# Patient Record
Sex: FEMALE | Race: OTHER
[De-identification: names, ages, dates, MRNs, and addresses within clinical notes are randomized per-mention and may not be internally consistent; named-entity substitution may affect disease eponyms.]

---

## 2019-03-17 ENCOUNTER — HOSPITAL ENCOUNTER (EMERGENCY)
Dept: HOSPITAL 25 - ED | Age: 19
Discharge: HOME | End: 2019-03-17
Payer: COMMERCIAL

## 2019-03-17 VITALS — DIASTOLIC BLOOD PRESSURE: 70 MMHG | SYSTOLIC BLOOD PRESSURE: 113 MMHG

## 2019-03-17 DIAGNOSIS — F10.129: Primary | ICD-10-CM

## 2019-03-17 PROCEDURE — 99283 EMERGENCY DEPT VISIT LOW MDM: CPT

## 2019-03-17 NOTE — ED
Substance Abuse/Use





- HPI Summary


HPI Summary: 


LEVEL 5 CAVEAT DUE TO ALCOHOL INTOXICATION


This patient is a 18 year old F brought in by ambulance from [place] to [facility] 

with a chief complaint of alcohol intoxication since PTA. Her friends called 

EMS. The patient rates the pain 0/10 in severity. Symptoms aggravated by 

nothing. Symptoms alleviated by nothing. EMS reports N/V.





- History Of Current Complaint


Chief Complaint: EDSubstanceAbuse


Stated Complaint: ETOH PER EMS


Hx Obtained From: EMS


Hx From Patient Unobtainable Due To: Other - LEVEL 5 CAVEAT DUE TO ALCOHOL 

INTOXICATION


Onset/Duration  of Drug/ETOH Abuse: Hours


Timing Of Abuse: Binge Use


Severity Currently: None


Aggravating Factor(s): Nothing


Alleviating Factor(s): Nothing


Associated Signs And Symptoms: Nausea, Vomiting





- Allergies/Home Medications


Allergies/Adverse Reactions: 


 Allergies











Allergy/AdvReac Type Severity Reaction Status Date / Time


 


Unable to Assess Allergy   Verified 03/17/19 02:20











Home Medications: 


 Home Medications





Unobtainable  03/17/19 [History Confirmed 03/17/19]











PMH/Surg Hx/FS Hx/Imm Hx


Previously Healthy: No - LEVEL 5 CAVEAT DUE TO ALCOHOL INTOXICATION


Cardiovascular History: 


   Denies: Hx Coronary Artery Disease





- Immunization History


Immunizations Up to Date: Unable to Obtain/Confirm


Infectious Disease History: Unable to Obtain/Confirm


Infectious Disease History: 


   Denies: Traveled Outside the [place] in Last 30 Days





- Family History


Known Family History: Positive: Unknown - LEVEL 5 CAVEAT DUE TO ALCOHOL 

INTOXICATION





- Social History


Alcohol Use: unable to obtain


Hx Substance Use: No


Substance Use Type: Reports: None


Substance Use Comment - Amount & Last Used: unable to obtain


Hx Tobacco Use: No


Smoking Status (MU): Unknown if Ever Smoked





Review of Systems


Positive: Other - alcohol intoxication


Positive: Vomiting, Nausea


All Other Systems Reviewed And Are Negative: No





Physical Exam





- Summary


Physical Exam Summary: 


Appearance: Appears intoxicated


Skin: Warm, dry, no obvious rash


Eyes: sclera anicteric, no conjunctival pallor


ENT: mucous membranes moist


Neck: deferred      


Respiratory: No signs of respiratory distress


Cardiovascular: Appears well perfused, pulses are nml


Abdomen: deferred


Musculoskeletal: Moving all 4 extremities without obvious discomfort


Triage Information Reviewed: Yes


Vital Signs On Initial Exam: 


 Initial Vitals











Temp Pulse Resp BP Pulse Ox


 


 97.4 F   109   14   130/83   97 


 


 03/17/19 02:06  03/17/19 02:06  03/17/19 02:06  03/17/19 02:06  03/17/19 02:06











Vital Signs Reviewed: Yes


Completion Of Physical Exam Limited Due To: Level 5 - LEVEL 5 CAVEAT DUE TO 

ALCOHOL INTOXICATION





Diagnostics





- Vital Signs


 Vital Signs











  Temp Pulse Resp BP Pulse Ox


 


 03/17/19 02:14   94   117/80  97


 


 03/17/19 02:06  97.4 F  109  14  130/83  97














- Laboratory


Lab Statement: Any lab studies that have been ordered have been reviewed, and 

results considered in the medical decision making process.





Course/Dx





- Course


Assessment/Plan: LEVEL 5 CAVEAT DUE TO ALCOHOL INTOXICATION.  This patient is a 

18 year old F brought in by ambulance from [place] to [facility] with a chief 

complaint of alcohol intoxication since PTA. After sobering up, the patient 

will be discharged with dx of alcohol intoxication. Patient understands and 

agrees with this plan.





- Diagnoses


Differential Diagnosis/HQI/PQRI: Positive: Other - alcohol intoxication


Provider Diagnoses: 


 Alcohol intoxication








Discharge





- Sign-Out/Discharge


Documenting (check all that apply): Patient Departure - discharge


Patient Received Moderate/Deep Sedation with Procedure: No





- Discharge Plan


Condition: Improved


Disposition: HOME


Patient Education Materials:  Alcohol Intoxication (ED)


Referrals: 


[facility],[place] [Primary Care Provider] - 


Additional Instructions: 


Ending up in an ER because of alcohol intoxication is often a red flag that you 

may have a drinking problem. I would encourage you to reflect on your drinking, 

perhaps talk to friends about it, and maybe even attend an AA meeting. It is 

much easier to change a problem behavior when you are young than ignoring it 

until you get older and the behavior gets more entrenched.





- Attestation Statements


Document Initiated by Scribe: Yes


Documenting Scribe: Adis Key


Provider For Whom Scribe is Documenting (Include Credential): Huan Haynes MD


Scribe Attestation: 


I, Adis Key, scribed for Huan Haynes MD on 03/17/19 at 0435.

## 2020-01-26 ENCOUNTER — HOSPITAL ENCOUNTER (EMERGENCY)
Dept: HOSPITAL 25 - UCEAST | Age: 20
Discharge: HOME | End: 2020-01-26
Payer: COMMERCIAL

## 2020-01-26 VITALS — SYSTOLIC BLOOD PRESSURE: 110 MMHG | DIASTOLIC BLOOD PRESSURE: 70 MMHG

## 2020-01-26 DIAGNOSIS — J10.1: Primary | ICD-10-CM

## 2020-01-26 LAB — FLUBV RNA SPEC QL NAA+PROBE: POSITIVE

## 2020-01-26 PROCEDURE — G0463 HOSPITAL OUTPT CLINIC VISIT: HCPCS

## 2020-01-26 PROCEDURE — 99212 OFFICE O/P EST SF 10 MIN: CPT

## 2020-01-26 NOTE — UC
FLU HPI





- HPI Summary


HPI Summary: 


LESS THAN 48 HOURS OF COUGH, CONGESTION, HEADACHE, BODY ACHES, FATIGUE AND SORE 

THROAT.  UP-TO-DATE FLU SHOT.  PALOMO STUDENT.








- History of Current Complaint


Chief Complaint: UCRespiratory


Stated Complaint: CONGESTION


Time Seen by Provider: 01/26/20 18:22


Hx Obtained From: Patient


Hx Last Menstrual Period: 1/2/20


Onset/Duration: Gradual Onset, Lasting Days, Still Present


Severity Currently: Moderate


Severity Initially: Moderate


Pain Intensity: 5


Pain Scale Used: 0-10 Numeric


Associated Signs & Symptoms: Positive: Fever, Myalgia, Cough, Sore Throat, 

Nasal Congestion, Headache





- Allergy/Home Medications


Allergies/Adverse Reactions: 


 Allergies











Allergy/AdvReac Type Severity Reaction Status Date / Time


 


No Known Allergies Allergy   Verified 01/26/20 18:08











Home Medications: 


 Home Medications





Birth Control 1 tab PO DAILY 01/26/20 [History Confirmed 01/26/20]


Ibuprofen TAB* [Motrin TAB* 400 MG] 400 mg PO Q6HR PRN 01/26/20 [History 

Confirmed 01/26/20]











PMH/Surg Hx/FS Hx/Imm Hx


Respiratory History: Asthma





- Surgical History


Surgical History: None





- Family History


Known Family History: Positive: Non-Contributory





- Social History


Alcohol Use: Occasionally


Substance Use Type: Marijuana


Substance Use Comment - Amount & Last Used: occasional


Smoking Status (MU): Never Smoked Tobacco





Review of Systems


All Other Systems Reviewed And Are Negative: Yes


Constitutional: Positive: Fever, Chills, Fatigue


ENT: Positive: Sore Throat, Nasal Discharge


Respiratory: Positive: Cough


Cardiovascular: Positive: Negative


Gastrointestinal: Positive: Negative


Musculoskeletal: Positive: Myalgia


Neurological: Positive: Headache





Physical Exam


Triage Information Reviewed: Yes


Appearance: Well-Appearing, No Pain Distress, Well-Nourished


Vital Signs: 


 Initial Vital Signs











Temp  98.3 F   01/26/20 18:04


 


Pulse  94   01/26/20 18:04


 


Resp  16   01/26/20 18:04


 


BP  110/70   01/26/20 18:04


 


Pulse Ox  100   01/26/20 18:04








 Laboratory Tests











  01/26/20





  18:19


 


Influenza B (Rapid)  Positive A











Vital Signs Reviewed: Yes


Eyes: Positive: Conjunctiva Clear


ENT: Positive: Hearing grossly normal, Pharynx normal, TMs normal


Neck: Positive: Supple, Nontender, No Lymphadenopathy


Respiratory Exam: Normal


Cardiovascular Exam: Normal


Abdomen Description: Positive: Soft


Musculoskeletal: Positive: No Edema


Neurological: Positive: Alert


Psychological: Positive: Age Appropriate Behavior


Skin: Negative: Rashes





Flu Course/Dx





- Course


Course Of Treatment: 





SWAB POSITIVE FOR INFLUENZA B.  TAMIFLU TWICE DAILY FOR 5 DAYS.  REST, HYDRATE, 

OTC MEDS AS NEEDED.  OUT OF SCHOOL FOR THE NEXT WEEK.





- Differential Dx/Diagnosis


Provider Diagnosis: 


 Influenza B








Discharge ED





- Sign-Out/Discharge


Documenting (check all that apply): Patient Departure


All imaging exams completed and their final reports reviewed: No Studies





- Discharge Plan


Condition: Stable


Disposition: HOME


Prescriptions: 


Oseltamivir CAP* [Tamiflu CAP*] 75 mg PO BID #8 cap


Patient Education Materials:  Influenza (ED)


Forms:  *School Release


Referrals: 


[facility] [Provider Group] - If Needed


Additional Instructions: 


SWAB POSITIVE FOR INFLUENZA B. TAMIFLU TWICE DAILY FOR 5 DAYS. OTC MEDS AS 

NEEDED FOR FEVER, BODY ACHES. STAY WELL HYDRATED AND RESTED. SEEK FOLLOW-UP IF 

YOU ARE NOT IMPROVING AS EXPECTED. 





- Billing Disposition and Condition


Condition: STABLE


Disposition: Home